# Patient Record
Sex: MALE | Race: WHITE | Employment: FULL TIME | URBAN - METROPOLITAN AREA
[De-identification: names, ages, dates, MRNs, and addresses within clinical notes are randomized per-mention and may not be internally consistent; named-entity substitution may affect disease eponyms.]

---

## 2017-07-12 PROBLEM — N52.9 ERECTILE DYSFUNCTION, UNSPECIFIED ERECTILE DYSFUNCTION TYPE: Status: ACTIVE | Noted: 2017-07-12

## 2017-07-12 PROBLEM — G47.9 SLEEP DISTURBANCE: Status: ACTIVE | Noted: 2017-07-12

## 2017-07-12 PROBLEM — R45.84 ANHEDONIA: Status: ACTIVE | Noted: 2017-07-12

## 2017-07-12 PROBLEM — R03.0 ELEVATED BLOOD PRESSURE READING: Status: ACTIVE | Noted: 2017-07-12

## 2017-07-12 PROBLEM — M25.50 ARTHRALGIA, UNSPECIFIED JOINT: Status: ACTIVE | Noted: 2017-07-12

## 2017-07-12 PROBLEM — H91.93 HEARING DEFICIT, BILATERAL: Status: ACTIVE | Noted: 2017-07-12

## 2017-07-12 PROBLEM — E66.01 MORBID OBESITY DUE TO EXCESS CALORIES (HCC): Status: ACTIVE | Noted: 2017-07-12

## 2017-07-12 PROBLEM — R06.83 SNORES: Status: ACTIVE | Noted: 2017-07-12

## 2017-07-12 PROBLEM — R68.82 LIBIDO, DECREASED: Status: ACTIVE | Noted: 2017-07-12

## 2017-07-12 PROCEDURE — 82607 VITAMIN B-12: CPT | Performed by: FAMILY MEDICINE

## 2017-07-12 PROCEDURE — 81003 URINALYSIS AUTO W/O SCOPE: CPT | Performed by: FAMILY MEDICINE

## 2017-07-12 PROCEDURE — 84402 ASSAY OF FREE TESTOSTERONE: CPT | Performed by: FAMILY MEDICINE

## 2017-07-12 PROCEDURE — 84403 ASSAY OF TOTAL TESTOSTERONE: CPT | Performed by: FAMILY MEDICINE

## 2017-07-13 PROBLEM — E78.2 MIXED HYPERLIPIDEMIA: Status: ACTIVE | Noted: 2017-07-13

## 2017-08-24 PROBLEM — G47.33 OSA (OBSTRUCTIVE SLEEP APNEA): Status: ACTIVE | Noted: 2017-08-24

## 2017-10-11 PROBLEM — R05.9 COUGH: Status: ACTIVE | Noted: 2017-10-11

## 2017-10-11 PROBLEM — I10 ESSENTIAL HYPERTENSION: Status: ACTIVE | Noted: 2017-07-12

## 2018-02-28 PROCEDURE — 82607 VITAMIN B-12: CPT | Performed by: FAMILY MEDICINE

## 2019-04-01 PROBLEM — M47.812 SPONDYLOSIS OF CERVICAL REGION WITHOUT MYELOPATHY OR RADICULOPATHY: Status: ACTIVE | Noted: 2019-04-01

## 2019-04-01 PROBLEM — R01.1 HEART MURMUR: Status: ACTIVE | Noted: 2019-04-01

## 2019-04-01 PROBLEM — R05.9 COUGH: Status: RESOLVED | Noted: 2017-10-11 | Resolved: 2019-04-01

## 2019-05-01 PROBLEM — J30.2 SEASONAL ALLERGIES: Status: ACTIVE | Noted: 2019-05-01

## 2019-10-21 PROBLEM — R45.84 ANHEDONIA: Status: RESOLVED | Noted: 2017-07-12 | Resolved: 2019-10-21

## 2019-10-21 PROBLEM — M47.22 CERVICAL SPONDYLOSIS WITH RADICULOPATHY: Status: ACTIVE | Noted: 2019-10-21

## 2020-12-28 ENCOUNTER — LAB ENCOUNTER (OUTPATIENT)
Dept: LAB | Age: 60
End: 2020-12-28
Attending: INTERNAL MEDICINE
Payer: COMMERCIAL

## 2020-12-28 DIAGNOSIS — Z86.010 PERSONAL HISTORY OF COLONIC POLYPS: ICD-10-CM

## 2020-12-29 LAB — SARS-COV-2 RNA RESP QL NAA+PROBE: NOT DETECTED

## 2020-12-30 ENCOUNTER — ANESTHESIA (OUTPATIENT)
Dept: ENDOSCOPY | Facility: HOSPITAL | Age: 60
End: 2020-12-30
Payer: COMMERCIAL

## 2020-12-30 ENCOUNTER — ANESTHESIA EVENT (OUTPATIENT)
Dept: ENDOSCOPY | Facility: HOSPITAL | Age: 60
End: 2020-12-30
Payer: COMMERCIAL

## 2020-12-30 ENCOUNTER — HOSPITAL ENCOUNTER (OUTPATIENT)
Facility: HOSPITAL | Age: 60
Setting detail: HOSPITAL OUTPATIENT SURGERY
Discharge: HOME | End: 2020-12-30
Attending: INTERNAL MEDICINE | Admitting: INTERNAL MEDICINE
Payer: COMMERCIAL

## 2020-12-30 VITALS
SYSTOLIC BLOOD PRESSURE: 129 MMHG | BODY MASS INDEX: 42.53 KG/M2 | DIASTOLIC BLOOD PRESSURE: 60 MMHG | HEIGHT: 67 IN | RESPIRATION RATE: 18 BRPM | WEIGHT: 271 LBS | TEMPERATURE: 98 F | OXYGEN SATURATION: 97 % | HEART RATE: 56 BPM

## 2020-12-30 DIAGNOSIS — Z86.010 PERSONAL HISTORY OF COLONIC POLYPS: Primary | ICD-10-CM

## 2020-12-30 PROCEDURE — 0DBN8ZX EXCISION OF SIGMOID COLON, VIA NATURAL OR ARTIFICIAL OPENING ENDOSCOPIC, DIAGNOSTIC: ICD-10-PCS | Performed by: INTERNAL MEDICINE

## 2020-12-30 PROCEDURE — 0DBP8ZX EXCISION OF RECTUM, VIA NATURAL OR ARTIFICIAL OPENING ENDOSCOPIC, DIAGNOSTIC: ICD-10-PCS | Performed by: INTERNAL MEDICINE

## 2020-12-30 PROCEDURE — 88305 TISSUE EXAM BY PATHOLOGIST: CPT | Performed by: INTERNAL MEDICINE

## 2020-12-30 RX ORDER — SODIUM CHLORIDE, SODIUM LACTATE, POTASSIUM CHLORIDE, CALCIUM CHLORIDE 600; 310; 30; 20 MG/100ML; MG/100ML; MG/100ML; MG/100ML
INJECTION, SOLUTION INTRAVENOUS CONTINUOUS
Status: CANCELLED | OUTPATIENT
Start: 2020-12-30

## 2020-12-30 RX ORDER — SODIUM CHLORIDE, SODIUM LACTATE, POTASSIUM CHLORIDE, CALCIUM CHLORIDE 600; 310; 30; 20 MG/100ML; MG/100ML; MG/100ML; MG/100ML
INJECTION, SOLUTION INTRAVENOUS CONTINUOUS
Status: DISCONTINUED | OUTPATIENT
Start: 2020-12-30 | End: 2020-12-30

## 2020-12-30 RX ORDER — LIDOCAINE HYDROCHLORIDE 10 MG/ML
INJECTION, SOLUTION EPIDURAL; INFILTRATION; INTRACAUDAL; PERINEURAL AS NEEDED
Status: DISCONTINUED | OUTPATIENT
Start: 2020-12-30 | End: 2020-12-30 | Stop reason: SURG

## 2020-12-30 RX ORDER — NALOXONE HYDROCHLORIDE 0.4 MG/ML
80 INJECTION, SOLUTION INTRAMUSCULAR; INTRAVENOUS; SUBCUTANEOUS AS NEEDED
Status: CANCELLED | OUTPATIENT
Start: 2020-12-30 | End: 2020-12-30

## 2020-12-30 RX ADMIN — SODIUM CHLORIDE, SODIUM LACTATE, POTASSIUM CHLORIDE, CALCIUM CHLORIDE: 600; 310; 30; 20 INJECTION, SOLUTION INTRAVENOUS at 10:16:00

## 2020-12-30 RX ADMIN — LIDOCAINE HYDROCHLORIDE 50 MG: 10 INJECTION, SOLUTION EPIDURAL; INFILTRATION; INTRACAUDAL; PERINEURAL at 09:55:00

## 2020-12-30 NOTE — ANESTHESIA PREPROCEDURE EVALUATION
PRE-OP EVALUATION    Patient Name: Preston Love    Pre-op Diagnosis: Personal history of colonic polyps [Z86.010]    Procedure(s):  COLONOSCOPY    Surgeon(s) and Role:     * Jane Smith MD - Primary    Pre-op vitals reviewed. Temp: 98.2 °F (36. status: Former Smoker        Packs/day: 2.00        Years: 20.00        Pack years: 40        Types: Cigarettes      Smokeless tobacco: Never Used    Alcohol use: Yes      Comment: occasional      Drug use:    Types: Cannabis   Comment: rarely     Availabl

## 2020-12-30 NOTE — ANESTHESIA POSTPROCEDURE EVALUATION
[facility] Vinetta Rolls Patient Status:  Hospital Outpatient Surgery   Age/Gender 61year old male MRN [id number]   Location [address] [facility]. Attending Hawa Tai MD   Hosp Day # 0 PCP Martina Chauhan MD       Anesthesia Post

## 2020-12-30 NOTE — OPERATIVE REPORT
OPERATIVE REPORT   PATIENT NAME: Eve Chavez  MRN: [id number]  DATE OF OPERATION: 12/30/2020  PREOPERATIVE DIAGNOSIS: large polyp previously biopsied; here for complete resection  POSTOPERATIVE DIAGNOSES   1. Large 2.5 cm pedunculated polyp on long thic hemorrhoids were noted. There were no immediate complications. FINDINGS   1. Polyps were all remvoed  RECOMMENDATIONS: Repeat colonoscopy in 1 year with Dr. Sam White to assess for polyp recurrence.   Please do not take any NSAIDS (drugs like ibuprofen, Motr

## 2020-12-30 NOTE — H&P
History & Physical Examination    Patient Name: Nneka Arias  MRN: [id number]  CSN: [id number]  YOB: 1960    Diagnosis: Personal history of colonic polyps [Z86.010]      Present Illness:  Nneka Arias is a 61year old male is here Person

## 2021-01-31 NOTE — PROGRESS NOTES
Your colonoscopy showed polyps in the colon that were removed. Here are the  biopsy/pathology findings from your recent Colonoscopy : Adenomatous polyp(s) was(were) found in the colon. These polyps are benign but can carry cancer risk.   Polyp(s) was

## 2023-11-16 PROBLEM — J30.2 SEASONAL ALLERGIES: Status: ACTIVE | Noted: 2019-05-01

## 2023-11-16 PROBLEM — M47.22 CERVICAL SPONDYLOSIS WITH RADICULOPATHY: Status: ACTIVE | Noted: 2019-10-21

## 2023-11-16 PROBLEM — G47.33 OBSTRUCTIVE SLEEP APNEA SYNDROME: Status: ACTIVE | Noted: 2017-08-24

## 2023-11-16 PROBLEM — E78.00 HYPERCHOLESTEROLEMIA: Status: ACTIVE | Noted: 2021-10-05

## 2023-11-16 PROBLEM — K63.5 POLYP OF COLON: Status: ACTIVE | Noted: 2023-11-16

## 2023-11-16 PROBLEM — E66.01 MORBID OBESITY  (CMD): Status: ACTIVE | Noted: 2017-07-12

## 2023-11-16 PROBLEM — I10 ESSENTIAL HYPERTENSION: Status: ACTIVE | Noted: 2017-07-12

## 2023-11-16 PROBLEM — N52.9 ERECTILE DYSFUNCTION: Status: ACTIVE | Noted: 2017-07-12

## 2023-12-27 LAB — COLONOSCOPY STUDY: NORMAL

## 2024-03-14 PROBLEM — Z86.010 HISTORY OF ADENOMATOUS POLYP OF COLON: Status: ACTIVE | Noted: 2023-12-12

## 2024-03-14 PROBLEM — G47.30 SLEEP APNEA: Status: ACTIVE | Noted: 2024-03-14

## 2024-03-14 PROBLEM — M47.22 RADICULOPATHY DUE TO CERVICAL SPONDYLOSIS: Status: ACTIVE | Noted: 2019-10-21

## 2024-03-14 PROBLEM — M25.519 SHOULDER PAIN: Status: ACTIVE | Noted: 2024-02-05

## 2024-08-02 ENCOUNTER — CLINICAL ABSTRACT (OUTPATIENT)
Dept: OTHER | Age: 64
End: 2024-08-02

## (undated) DEVICE — GLOVE SURG SENSICARE SZ 7

## (undated) DEVICE — ENDOSCOPY PACK - LOWER: Brand: MEDLINE INDUSTRIES, INC.

## (undated) DEVICE — SNARE CAPTI HEX STIFF MEDIUM

## (undated) DEVICE — FILTERLINE NASAL ADULT O2/CO2

## (undated) DEVICE — 1200CC GUARDIAN II: Brand: GUARDIAN

## (undated) DEVICE — CLIP LGT 11MM OPEN 2.8MM 235CM

## (undated) DEVICE — 3M™ RED DOT™ MONITORING ELECTRODE WITH FOAM TAPE AND STICKY GEL, 50/BAG, 20/CASE, 72/PLT 2570: Brand: RED DOT™

## (undated) DEVICE — REM POLYHESIVE ADULT PATIENT RETURN ELECTRODE: Brand: VALLEYLAB

## (undated) DEVICE — Device: Brand: DEFENDO AIR/WATER/SUCTION AND BIOPSY VALVE

## (undated) DEVICE — NEEDLE CONTRAST INTERJECT 25G